# Patient Record
Sex: MALE | Race: WHITE | NOT HISPANIC OR LATINO | Employment: OTHER | URBAN - METROPOLITAN AREA
[De-identification: names, ages, dates, MRNs, and addresses within clinical notes are randomized per-mention and may not be internally consistent; named-entity substitution may affect disease eponyms.]

---

## 2023-02-16 ENCOUNTER — OFFICE VISIT (OUTPATIENT)
Dept: INTERNAL MEDICINE | Facility: CLINIC | Age: 33
End: 2023-02-16
Payer: COMMERCIAL

## 2023-02-16 VITALS
BODY MASS INDEX: 25.58 KG/M2 | TEMPERATURE: 97.8 F | HEART RATE: 70 BPM | WEIGHT: 163 LBS | OXYGEN SATURATION: 98 % | SYSTOLIC BLOOD PRESSURE: 124 MMHG | DIASTOLIC BLOOD PRESSURE: 64 MMHG | RESPIRATION RATE: 18 BRPM | HEIGHT: 67 IN

## 2023-02-16 DIAGNOSIS — D22.9 NEVUS: ICD-10-CM

## 2023-02-16 DIAGNOSIS — Z13.9 ENCOUNTER FOR SCREENING: ICD-10-CM

## 2023-02-16 DIAGNOSIS — Z00.00 HEALTHCARE MAINTENANCE: Primary | ICD-10-CM

## 2023-02-16 DIAGNOSIS — Z30.09 VASECTOMY EVALUATION: ICD-10-CM

## 2023-02-16 PROCEDURE — 2014F MENTAL STATUS ASSESS: CPT | Performed by: STUDENT IN AN ORGANIZED HEALTH CARE EDUCATION/TRAINING PROGRAM

## 2023-02-16 PROCEDURE — 3008F BODY MASS INDEX DOCD: CPT | Performed by: STUDENT IN AN ORGANIZED HEALTH CARE EDUCATION/TRAINING PROGRAM

## 2023-02-16 PROCEDURE — 99385 PREV VISIT NEW AGE 18-39: CPT | Performed by: STUDENT IN AN ORGANIZED HEALTH CARE EDUCATION/TRAINING PROGRAM

## 2023-02-16 NOTE — PATIENT INSTRUCTIONS

## 2023-02-16 NOTE — PROGRESS NOTES
New Patient Office Visit      Date: 2023   Patient Name: David Ross  : 1990   MRN: [id number]     Chief Complaint:    Chief Complaint   Patient presents with   • Annual Exam     Establish care       History of Present Illness: David Ross is a 32 y.o. male who is here today to establish care.    The patient would like a referral for a vasectomy. The patient denies any pain in that region or swelling. He would like to have it done as a form of contraception.     The patient mentions he has had a small spot on his skin for the past 10-15 years. He says it looks blueish in color. He says that he had it looked at several years ago but was told it was fine. He would like to see a dermatologist for an opinion.     The patient states that occasionally he has some neck and hip pain but manages it through a chiropractor and exercise. He states he drives a lot and can be sitting for 4-6 hours at a time which aggravates the hip pain. He states he has never taken regular pain medication for it.     The patient states his father has Parkinsons and high blood pressure. His father has had CABG a few times. He states his grandmother had pancreatic cancer. The patient recently moved and has not yet seen a dentist or ophthalmologist in the area yet. The patient states he does well with diet and exercise but could use more. He says he plans on signing up for a gym. He denies feeling unsafe at home or is a victim of any abuse.     The patient believes that he is up to date on all his vaccines aside from tetanus. He will have his medical records sent from his prior doctor in [place].      Subjective      Review of Systems:   Review of Systems   Constitutional: Negative for activity change, appetite change, fatigue and fever.   Eyes: Negative for blurred vision, photophobia and visual disturbance.   Respiratory: Negative for cough, chest tightness and shortness of breath.    Cardiovascular: Negative for chest  pain and palpitations.   Gastrointestinal: Negative for abdominal distention, abdominal pain, blood in stool, constipation, diarrhea, nausea and vomiting.   Genitourinary: Negative for dysuria and hematuria.   Musculoskeletal: Negative for arthralgias, back pain and joint swelling.   Skin: Negative for rash and wound.   Neurological: Negative for weakness, headache and confusion.       Past Medical History: History reviewed. No pertinent past medical history.    Past Surgical History: History reviewed. No pertinent surgical history.    Family History:   Family History   Problem Relation Age of Onset   • Parkinsonism Father    • Coronary artery disease Father    • Hypertension Father    • Pancreatic cancer Maternal Grandmother        Social History:   Social History     Socioeconomic History   • Marital status:    Tobacco Use   • Smoking status: Never   • Smokeless tobacco: Never   Vaping Use   • Vaping Use: Never used       Medications:   No current outpatient medications on file.    Allergies:   No Known Allergies    Immunizations:    There is no immunization history on file for this patient.     Colorectal Screening: Up-to-date  Last Completed Colonoscopy     This patient has no relevant Health Maintenance data.        CT for Smoker (Age 50-80, 20pk yr within last 15 years): Up-to-date  Bone Density/DEXA (high risk): Not applicable  Hep C (Age 18-79 once): Deferred  HIV (Age 15-65 once) : N/A  PSA (Over age 50, C Level Recommendation): Did not order  US Aorta (For male smokers, age 65): Not applicable  A1c: N/A  Lipid panel: N/A      Dermatology: ordered per request  Ophthalmologist: plans to establish  Dentist: plans to establish    Tobacco Use: Low Risk    • Smoking Tobacco Use: Never   • Smokeless Tobacco Use: Never   • Passive Exposure: Not on file       Social History     Substance and Sexual Activity   Alcohol Use None        Social History     Substance and Sexual Activity   Drug Use Not on file     "    Diet/Physical activity: Counseled on 02/16/23    PHQ-2 Depression Screening  PHQ-9 Total Score: 0       Intimate partner violence: (Screen on initial visit, older adult with injury or evidence of neglect): No concerns  • Violence can be a problem in many people's lives, so I now ask every patient about trauma or abuse they may have experienced in a relationship.  • Stress/Safety - Do you feel safe in your relationship?  • Afraid/Abused - Have you ever been in a relationship where you were threatened, hurt, or afraid?  • Friend/Family - Are your friends aware you have been hurt?  • Emergency Plan - Do you have a safe place to go and the resources you need in an emergency?    Osteoporosis: No concerns  • Men: history of low trauma fracture, androgen deprivation therapy for prostate cancer, hypogonadism, primary hyperparathyroidism, intestinal disorders.     Objective     Physical Exam:  Vital Signs:   Vitals:    02/16/23 1039   BP: 124/64   BP Location: Right arm   Patient Position: Sitting   Cuff Size: Adult   Pulse: 70   Resp: 18   Temp: 97.8 °F (36.6 °C)   TempSrc: Temporal   SpO2: 98%   Weight: 73.9 kg (163 lb)   Height: 171 cm (67.32\")   PainSc: 0-No pain     Body mass index is 25.29 kg/m².    Physical Exam  Vitals and nursing note reviewed.   Constitutional:       General: He is not in acute distress.     Appearance: Normal appearance. He is normal weight. He is not ill-appearing or toxic-appearing.   HENT:      Nose: No congestion or rhinorrhea.   Eyes:      General:         Right eye: No discharge.         Left eye: No discharge.      Conjunctiva/sclera: Conjunctivae normal.   Cardiovascular:      Rate and Rhythm: Normal rate and regular rhythm.      Heart sounds: Normal heart sounds. No murmur heard.    No friction rub.   Pulmonary:      Effort: Pulmonary effort is normal. No respiratory distress.      Breath sounds: Normal breath sounds. No wheezing or rhonchi.   Abdominal:      General: Abdomen is flat. " Bowel sounds are normal. There is no distension.      Palpations: Abdomen is soft. There is no mass.      Tenderness: There is no abdominal tenderness. There is no guarding or rebound.   Musculoskeletal:      Cervical back: Normal range of motion.      Right lower leg: No edema.      Left lower leg: No edema.   Skin:     Findings: No lesion or rash.   Neurological:      General: No focal deficit present.      Mental Status: He is alert. Mental status is at baseline.      Coordination: Coordination normal.      Gait: Gait normal.   Psychiatric:         Mood and Affect: Mood normal.         Behavior: Behavior normal.         Thought Content: Thought content normal.         Judgment: Judgment normal.         Procedures    Results:     Labs:   No results found for: HGBA1C, CMP, CBCDIFFPANEL, CREAT, TSH     Imaging:   No valid procedures specified.     Assessment / Plan      Assessment/Plan:   Problem List Items Addressed This Visit        Genitourinary and Reproductive     Vasectomy evaluation    Current Assessment & Plan     - I will write a referral to urology for a vasectomy         Relevant Orders    Ambulatory Referral to Urology       Skin    Nevus    Current Assessment & Plan     - I will write a referral to dermatology         Relevant Orders    Ambulatory Referral to Dermatology (Completed)   Other Visit Diagnoses     Healthcare maintenance    -  Primary    Encounter for screening        Relevant Orders    Ambulatory Referral to Dermatology (Completed)        Healthcare Maintenance:  Counseling provided based on age appropriate USPSTF guidelines.  BMI is >= 25 and <30. (Overweight) The following options were offered after discussion;: exercise counseling/recommendations and nutrition counseling/recommendations    David Ross voices understanding and acceptance of this advice and will call back with any further questions or concerns. AVS with preventive healthcare tips printed for patient.     “Discussed  risks/benefits to vaccination, reviewed components of the vaccine, discussed VIS, discussed informed consent, informed consent obtained. Patient/Parent was allowed to accept or refuse vaccine. Questions answered to satisfactory state of patient/Parent. We reviewed typical age appropriate and seasonally appropriate vaccinations. Reviewed immunization history and updated state vaccination form as needed. Patient was counseled on COVID-19  Influenza  Tdap      Follow Up:   Return in about 1 year (around 2/16/2024) for Annual.    Transcribed from ambient dictation for Jesse Christian MD by Annika Feliciano.  02/16/23   13:13 EST    Patient or patient representative verbalized consent to the visit recording.  I have personally performed the services described in this document as transcribed by the above individual, and it is both accurate and complete.      Jesse Christian MD  [facility]

## 2023-03-15 ENCOUNTER — OFFICE VISIT (OUTPATIENT)
Dept: UROLOGY | Facility: CLINIC | Age: 33
End: 2023-03-15
Payer: COMMERCIAL

## 2023-03-15 VITALS
DIASTOLIC BLOOD PRESSURE: 78 MMHG | WEIGHT: 163 LBS | BODY MASS INDEX: 25.58 KG/M2 | HEIGHT: 67 IN | SYSTOLIC BLOOD PRESSURE: 122 MMHG | HEART RATE: 78 BPM | OXYGEN SATURATION: 99 %

## 2023-03-15 DIAGNOSIS — Z30.09 VASECTOMY EVALUATION: Primary | ICD-10-CM

## 2023-03-15 PROCEDURE — 1160F RVW MEDS BY RX/DR IN RCRD: CPT | Performed by: STUDENT IN AN ORGANIZED HEALTH CARE EDUCATION/TRAINING PROGRAM

## 2023-03-15 PROCEDURE — 1159F MED LIST DOCD IN RCRD: CPT | Performed by: STUDENT IN AN ORGANIZED HEALTH CARE EDUCATION/TRAINING PROGRAM

## 2023-03-15 PROCEDURE — 99203 OFFICE O/P NEW LOW 30 MIN: CPT | Performed by: STUDENT IN AN ORGANIZED HEALTH CARE EDUCATION/TRAINING PROGRAM

## 2023-03-15 NOTE — PROGRESS NOTES
Office Note Vasectomy Consult     Patient Name: David Ross  : 1990   MRN: [id number]     Chief Complaint:  Elective Sterilization.   Chief Complaint   Patient presents with   • Vasectomy Consult       Referring Provider: Jesse Christian MD    History of Present Illness: David Ross is a 32 y.o. male who presents to Urology today with the desire for irreversible, elective sterilization.     He has 5 children. He states his wife has significant issues with pregnancy complications and would like to avoid future pregnancies.     His and his wife have discussed this decision at length and both would like to proceed with elective sterilization.    He has been considering this decision for 2 years.    Patient denies history of prior scrotal surgery, denies significant history of scrotal injury, denies any baseline chronic testicular pain.    Subjective      Review of Systems: Review of Systems   Genitourinary: Negative for decreased urine volume, difficulty urinating, dysuria, enuresis, flank pain, frequency, hematuria and urgency.      I have reviewed the ROS documented by my clinical staff, I have updated appropriately and I agree. Oli Mayer MD    Past Medical History: History reviewed. No pertinent past medical history.    Past Surgical History: History reviewed. No pertinent surgical history.    Family History:   Family History   Problem Relation Age of Onset   • Parkinsonism Father    • Coronary artery disease Father    • Hypertension Father    • Pancreatic cancer Maternal Grandmother        Social History:   Social History     Socioeconomic History   • Marital status:    Tobacco Use   • Smoking status: Never   • Smokeless tobacco: Never   Vaping Use   • Vaping Use: Never used       Medications:   No current outpatient medications on file.    Allergies:   No Known Allergies      Physical Exam:   Vital Signs:   Vitals:    03/15/23 0832   BP: 122/78   Pulse: 78   SpO2: 99%  "  Weight: 73.9 kg (163 lb)   Height: 171 cm (67.32\")     Body mass index is 25.29 kg/m².     Physical Exam  Vitals and nursing note reviewed.   Constitutional:       Appearance: Normal appearance.   HENT:      Head: Normocephalic and atraumatic.      Nose: Nose normal.      Mouth/Throat:      Mouth: Mucous membranes are moist.      Pharynx: Oropharynx is clear.   Eyes:      Extraocular Movements: Extraocular movements intact.      Conjunctiva/sclera: Conjunctivae normal.      Pupils: Pupils are equal, round, and reactive to light.   Cardiovascular:      Rate and Rhythm: Normal rate and regular rhythm.   Pulmonary:      Effort: Pulmonary effort is normal. No respiratory distress.   Abdominal:      Palpations: Abdomen is soft.   Genitourinary:     Comments:  Circumcised phallus, orthotopic meatus, bilaterally descended testicles without masses, or lesions. Vas deferens palpable bilaterally.        Musculoskeletal:         General: Normal range of motion.      Cervical back: Normal range of motion and neck supple.   Skin:     General: Skin is warm and dry.      Findings: No lesion or rash.   Neurological:      General: No focal deficit present.      Mental Status: He is alert and oriented to person, place, and time. Mental status is at baseline.   Psychiatric:         Mood and Affect: Mood normal.         Behavior: Behavior normal.         Labs:   Brief Urine Lab Results     None               No results found for: GLUCOSE, CALCIUM, NA, K, CO2, CL, BUN, CREATININE, EGFRIFAFRI, EGFRIFNONA, BCR, ANIONGAP    No results found for: WBC, HGB, HCT, MCV, PLT    Images:   No Images in the past 120 days found..    Measures:   Tobacco:   David Ross  reports that he has never smoked. He has never used smokeless tobacco.    Assessment / Plan      Assessment/Plan:   Mr. Ross is a 32 y.o. male who presented to clinic today for irreversible elective sterilization.      He would like to take some time to consider when he would " like to schedule. He was given cost estimate sheet to consider differences between surgery center and clinic vasectomy.     He was offered valium prior but he would like to defer.     Diagnoses and all orders for this visit:    1. Vasectomy evaluation (Primary)         Patient Education:   The patient has requested a vasectomy for elective sterilization and the risks and benefits of the procedure were explained at length. The procedure itself was explained and postop followup explained at this point. The patient was given a prescription for Valium 10 mg to be taken 30 minutes prior to the procedure.  We discussed he will need a  to take him home. I extensively reviewed with him the likely postoperative recuperative period as well as the need to continue to use contraception until he is notified by me of his sterility.     I discussed with the patient that I am able to perform this procedure in the urology clinic under a local anesthetic, and also offer the procedure to be performed at the outpatient surgery center under light anesthetic if that would be the desire of the patient.  I discussed that in clinic procedure is likely significantly cheaper than performing this at an outpatient surgery center.  I counseled the patient to speak to the outpatient surgery center billing department and with his insurance company prior to determine out-of-pocket costs if this is something he would like to consider.  When I perform this procedure in the clinic, I typically offer a 10 mg tablet of Valium 45 to 60 minutes prior to the vasectomy for anxiolysis.    He will undergo a semen analysis 3 months post-procedure AND 20 ejaculations before his first semen analysis check. He understands the potential side effects of anesthesia, bleeding, scrotal hematoma, wound infection, epididymo-orchitis, epididymal congestion, chronic testicular pain requiring further intervention/surgery, sperm granuloma, recanalization and risk of  sperm return and/or pregnancy  (1 in 2000 as per the AUA guidelines).     Patient understands and would like to proceed with vasectomy, he is unsure whether clinic vs surgery center at this time, he will reach out to me.     Follow Up:   No follow-ups on file.    I spent approximately 30 minutes providing clinical care for this patient; including review of patient's chart and provider documentation, face to face time spent with patient in examination room (obtaining history, performing physical exam, discussing diagnosis and management options), placing orders, and completing patient documentation.     Oli Mayer MD  [facility] [place]

## 2023-09-19 ENCOUNTER — TELEPHONE (OUTPATIENT)
Dept: INTERNAL MEDICINE | Facility: CLINIC | Age: 33
End: 2023-09-19

## 2023-09-19 ENCOUNTER — HOSPITAL ENCOUNTER (EMERGENCY)
Facility: HOSPITAL | Age: 33
Discharge: HOME | End: 2023-09-19
Attending: EMERGENCY MEDICINE | Admitting: EMERGENCY MEDICINE

## 2023-09-19 VITALS
DIASTOLIC BLOOD PRESSURE: 89 MMHG | TEMPERATURE: 99.5 F | SYSTOLIC BLOOD PRESSURE: 132 MMHG | OXYGEN SATURATION: 98 % | HEART RATE: 96 BPM | HEIGHT: 67 IN | RESPIRATION RATE: 18 BRPM | WEIGHT: 160 LBS | BODY MASS INDEX: 25.11 KG/M2

## 2023-09-19 DIAGNOSIS — M70.41 PREPATELLAR BURSITIS, RIGHT KNEE: Primary | ICD-10-CM

## 2023-09-19 PROCEDURE — 99283 EMERGENCY DEPT VISIT LOW MDM: CPT

## 2023-09-19 RX ORDER — SULFAMETHOXAZOLE AND TRIMETHOPRIM 800; 160 MG/1; MG/1
1 TABLET ORAL 2 TIMES DAILY
Qty: 14 TABLET | Refills: 0 | Status: SHIPPED | OUTPATIENT
Start: 2023-09-19 | End: 2023-09-26

## 2023-09-19 RX ORDER — SULFAMETHOXAZOLE AND TRIMETHOPRIM 800; 160 MG/1; MG/1
1 TABLET ORAL ONCE
Status: COMPLETED | OUTPATIENT
Start: 2023-09-19 | End: 2023-09-19

## 2023-09-19 RX ADMIN — SULFAMETHOXAZOLE AND TRIMETHOPRIM 1 TABLET: 800; 160 TABLET ORAL at 22:43

## 2023-09-20 NOTE — DISCHARGE INSTRUCTIONS
I recommend you take Tylenol 650 mg every 6 hours and ibuprofen 400 mg every 6 hours as needed for pain unless you have a contraindication to these medications  Use ice to help with pain and swelling.  Take prescribed bactrim.  Call to schedule a close followup appointment with an orthopedic physician.  Return to the ER as needed for new or worsening symptoms.

## 2023-09-20 NOTE — ED PROVIDER NOTES
Subjective   History of Present Illness  Patient presents for evaluation of pain, swelling, erythema of the right anterior knee that started yesterday evening.  It has limited his ability to walk because of the pain.  The pain is mostly when patient is in a completely flexed position or when he engages his extensor musculature.  No history of trauma to the knee, no prior procedural interventions on the knee or right lower extremity.  No numbness, weakness.  Patient has had chills and had a temperature of 38 °C yesterday.    History provided by:  Patient    Review of Systems    No past medical history on file.    No Known Allergies    No past surgical history on file.    Family History   Problem Relation Age of Onset    Parkinsonism Father     Coronary artery disease Father     Hypertension Father     Pancreatic cancer Maternal Grandmother        Social History     Socioeconomic History    Marital status:    Tobacco Use    Smoking status: Never    Smokeless tobacco: Never   Vaping Use    Vaping Use: Never used   Substance and Sexual Activity    Alcohol use: Not Currently           Objective   Physical Exam  Constitutional:       General: He is not in acute distress.  HENT:      Head: Normocephalic and atraumatic.   Eyes:      Conjunctiva/sclera: Conjunctivae normal.      Pupils: Pupils are equal, round, and reactive to light.   Cardiovascular:      Rate and Rhythm: Normal rate and regular rhythm.      Pulses: Normal pulses.      Heart sounds: No murmur heard.    No gallop.   Pulmonary:      Effort: Pulmonary effort is normal. No respiratory distress.   Abdominal:      General: Abdomen is flat. There is no distension.      Tenderness: There is no abdominal tenderness.   Musculoskeletal:      Comments: There is soft tissue swelling and erythema of the right knee that is isolated to the anterior superior aspect of the knee at the prepatellar bursa.  There is no erythema, swelling, tenderness along the lateral  aspects of the joint, or posterior aspect of the joint.  There is full range of motion.  With complete flexion of the knee the patient has significant tenderness but when held it in mid flexed or extended position patient does not have significant discomfort.  There is normal capillary refill distal.  Normal motor and sensation.   Skin:     General: Skin is warm and dry.      Capillary Refill: Capillary refill takes less than 2 seconds.   Neurological:      General: No focal deficit present.      Mental Status: He is alert and oriented to person, place, and time.   Psychiatric:         Mood and Affect: Mood normal.         Behavior: Behavior normal.       Procedures           ED Course                                           Medical Decision Making  Differential items considered includes prepatellar bursitis, septic arthritis, septic bursitis, skin soft tissue infection/cellulitis.  At this time patient's physical exam findings are very characteristic of a prepatellar bursitis and I have a very low suspicion for a septic arthritis at this time.  Patient feels comfortable with a course of conservative management with anti-inflammatories, rest, ice.  Given that he spiked a fever there may be bacterial infection of the bursa and so he was placed on a course of Bactrim.  He was given outpatient orthopedic follow-up.  He is able to achieve close follow-up with his primary care doctor as well.  He was counseled very strictly on signs of a septic arthritis or worsening infection and return precautions to the ER.      Problems Addressed:  Prepatellar bursitis, right knee: complicated acute illness or injury    Risk  OTC drugs.  Prescription drug management.        Final diagnoses:   Prepatellar bursitis, right knee       ED Disposition  ED Disposition       ED Disposition   Discharge    Condition   Stable    Comment   --           No results found for this or any previous visit (from the past 24 hour(s)).  Note: In  "addition to lab results from this visit, the labs listed above may include labs taken at another facility or during a different encounter within the last 24 hours. Please correlate lab times with ED admission and discharge times for further clarification of the services performed during this visit.    No orders to display     Vitals:    09/19/23 2030   BP: 132/89   BP Location: Right arm   Patient Position: Sitting   Pulse: 96   Resp: 18   Temp: 99.5 °F (37.5 °C)   TempSrc: Oral   SpO2: 98%   Weight: 72.6 kg (160 lb)   Height: 170.2 cm (67\")     Medications   sulfamethoxazole-trimethoprim (BACTRIM DS,SEPTRA DS) 800-160 MG per tablet 1 tablet (has no administration in time range)     ECG/EMG Results (last 24 hours)       ** No results found for the last 24 hours. **          No orders to display           Ubaldo Bowden MD  [address] [address]  [phone]    Call in 1 day           Medication List      No changes were made to your prescriptions during this visit.            Nicanor Hart MD  09/19/23 2211    "

## 2024-02-16 ENCOUNTER — OFFICE VISIT (OUTPATIENT)
Dept: INTERNAL MEDICINE | Facility: CLINIC | Age: 34
End: 2024-02-16
Payer: COMMERCIAL

## 2024-02-16 VITALS
DIASTOLIC BLOOD PRESSURE: 74 MMHG | WEIGHT: 158.8 LBS | BODY MASS INDEX: 24.92 KG/M2 | HEART RATE: 74 BPM | RESPIRATION RATE: 16 BRPM | SYSTOLIC BLOOD PRESSURE: 116 MMHG | TEMPERATURE: 97.3 F | HEIGHT: 67 IN

## 2024-02-16 DIAGNOSIS — E16.2 HYPOGLYCEMIA: ICD-10-CM

## 2024-02-16 DIAGNOSIS — Z00.00 HEALTHCARE MAINTENANCE: Primary | ICD-10-CM

## 2024-02-16 DIAGNOSIS — Z13.9 ENCOUNTER FOR SCREENING: ICD-10-CM

## 2024-02-16 PROBLEM — Z30.09 VASECTOMY EVALUATION: Status: RESOLVED | Noted: 2023-02-16 | Resolved: 2024-02-16

## 2024-02-16 LAB
ALBUMIN SERPL-MCNC: 4.7 G/DL (ref 3.5–5.2)
ALBUMIN/GLOB SERPL: 2 G/DL
ALP SERPL-CCNC: 58 U/L (ref 39–117)
ALT SERPL W P-5'-P-CCNC: 17 U/L (ref 1–41)
ANION GAP SERPL CALCULATED.3IONS-SCNC: 9 MMOL/L (ref 5–15)
AST SERPL-CCNC: 22 U/L (ref 1–40)
BASOPHILS # BLD AUTO: 0.09 10*3/MM3 (ref 0–0.2)
BASOPHILS NFR BLD AUTO: 1.5 % (ref 0–1.5)
BILIRUB SERPL-MCNC: 0.7 MG/DL (ref 0–1.2)
BUN SERPL-MCNC: 19 MG/DL (ref 6–20)
BUN/CREAT SERPL: 20.7 (ref 7–25)
CALCIUM SPEC-SCNC: 9.7 MG/DL (ref 8.6–10.5)
CHLORIDE SERPL-SCNC: 104 MMOL/L (ref 98–107)
CHOLEST SERPL-MCNC: 227 MG/DL (ref 0–200)
CO2 SERPL-SCNC: 28 MMOL/L (ref 22–29)
CREAT SERPL-MCNC: 0.92 MG/DL (ref 0.76–1.27)
DEPRECATED RDW RBC AUTO: 39.9 FL (ref 37–54)
EGFRCR SERPLBLD CKD-EPI 2021: 111.2 ML/MIN/1.73
EOSINOPHIL # BLD AUTO: 0.3 10*3/MM3 (ref 0–0.4)
EOSINOPHIL NFR BLD AUTO: 4.9 % (ref 0.3–6.2)
ERYTHROCYTE [DISTWIDTH] IN BLOOD BY AUTOMATED COUNT: 12.9 % (ref 12.3–15.4)
EXPIRATION DATE: NORMAL
GLOBULIN UR ELPH-MCNC: 2.3 GM/DL
GLUCOSE SERPL-MCNC: 86 MG/DL (ref 65–99)
HBA1C MFR BLD: 5 % (ref 4.5–5.7)
HCT VFR BLD AUTO: 44.3 % (ref 37.5–51)
HDLC SERPL-MCNC: 53 MG/DL (ref 40–60)
HGB BLD-MCNC: 15.1 G/DL (ref 13–17.7)
IMM GRANULOCYTES # BLD AUTO: 0.02 10*3/MM3 (ref 0–0.05)
IMM GRANULOCYTES NFR BLD AUTO: 0.3 % (ref 0–0.5)
LDLC SERPL CALC-MCNC: 149 MG/DL (ref 0–100)
LDLC/HDLC SERPL: 2.75 {RATIO}
LYMPHOCYTES # BLD AUTO: 3.18 10*3/MM3 (ref 0.7–3.1)
LYMPHOCYTES NFR BLD AUTO: 52.4 % (ref 19.6–45.3)
Lab: NORMAL
MCH RBC QN AUTO: 29.5 PG (ref 26.6–33)
MCHC RBC AUTO-ENTMCNC: 34.1 G/DL (ref 31.5–35.7)
MCV RBC AUTO: 86.7 FL (ref 79–97)
MONOCYTES # BLD AUTO: 0.55 10*3/MM3 (ref 0.1–0.9)
MONOCYTES NFR BLD AUTO: 9.1 % (ref 5–12)
NEUTROPHILS NFR BLD AUTO: 1.93 10*3/MM3 (ref 1.7–7)
NEUTROPHILS NFR BLD AUTO: 31.8 % (ref 42.7–76)
NRBC BLD AUTO-RTO: 0 /100 WBC (ref 0–0.2)
PLATELET # BLD AUTO: 198 10*3/MM3 (ref 140–450)
PMV BLD AUTO: 10.9 FL (ref 6–12)
POTASSIUM SERPL-SCNC: 4.2 MMOL/L (ref 3.5–5.2)
PROT SERPL-MCNC: 7 G/DL (ref 6–8.5)
RBC # BLD AUTO: 5.11 10*6/MM3 (ref 4.14–5.8)
SODIUM SERPL-SCNC: 141 MMOL/L (ref 136–145)
TRIGL SERPL-MCNC: 141 MG/DL (ref 0–150)
VLDLC SERPL-MCNC: 25 MG/DL (ref 5–40)
WBC NRBC COR # BLD AUTO: 6.07 10*3/MM3 (ref 3.4–10.8)

## 2024-02-16 PROCEDURE — 85025 COMPLETE CBC W/AUTO DIFF WBC: CPT | Performed by: STUDENT IN AN ORGANIZED HEALTH CARE EDUCATION/TRAINING PROGRAM

## 2024-02-16 PROCEDURE — 80061 LIPID PANEL: CPT | Performed by: STUDENT IN AN ORGANIZED HEALTH CARE EDUCATION/TRAINING PROGRAM

## 2024-02-16 PROCEDURE — 80053 COMPREHEN METABOLIC PANEL: CPT | Performed by: STUDENT IN AN ORGANIZED HEALTH CARE EDUCATION/TRAINING PROGRAM

## 2024-02-20 PROBLEM — E78.2 MIXED HYPERLIPIDEMIA: Status: ACTIVE | Noted: 2024-02-20

## 2024-11-11 ENCOUNTER — E-VISIT (OUTPATIENT)
Dept: FAMILY MEDICINE CLINIC | Facility: TELEHEALTH | Age: 34
End: 2024-11-11

## 2024-11-11 PROCEDURE — FABRICHEALTHVISIT: Performed by: NURSE PRACTITIONER

## 2024-11-11 NOTE — E-VISIT TREATED
Date: 2024 12:32:20  Clinician: Catrachita Juarez  Clinician NPI: [id number]  Patient: David Ross  Patient : 1990  Patient Address: [address] [place], [place] [address]  Patient Phone: [phone]  Visit Protocol: URI  Patient Summary:  David is a 34 year old ( : 1990 ) male who initiated a visit for cold, sinus infection, or influenza.     David states the symptoms started gradually 2-3 weeks ago. After the symptoms started, they improved and then got worse   again.   Symptom start date: unknown   The symptoms consist of a headache, nasal congestion, a cough, facial pain or pressure, a sore throat, and enlarged lymph nodes.   Symptom details     Nasal secretions: The color of the mucus is yellow and green.    Cough: David coughs a few times an hour and the cough is more bothersome at night. Phlegm comes into the throat when coughing. David believes the cough is caused by post-nasal drip. The color of the phlegm is yellow and green.     Sore throat: David reports having mild throat pain (1-3 on a 10 point pain scale), does not have exudate on the tonsils, and can swallow liquids without any difficulty. The lymph nodes in the neck are enlarged. The lymph node swelling is not worse on   one side and the swelling has not caused changes in speech or hoarseness in the voice. A rash has not appeared on the skin since the sore throat started.     Facial pain or pressure: The facial pain or pressure feels worse when bending over or leaning forward.     Headache: The headache is mild (1-3 on a 10 point pain scale).      David denies having myalgias, nausea, teeth pain, fever, ageusia, wheezing, malaise, anosmia, chills, ear pain, rhinitis, vomiting, and diarrhea. David also denies having recent facial or sinus surgery in the past 60 days, having a sinus infection   within the past year, and taking antibiotic medication in the past month. David is not experiencing dyspnea.   Precipitating events  Within  the past week, David has not been exposed to someone with strep throat. David has not recently been exposed to   someone with influenza. David has not been in close contact with any high risk individuals.    David has not received the influenza vaccination.   Pertinent COVID-19 (Coronavirus) information  Since the symptoms started, David has not tested for   COVID-19.   David has not had COVID-19 in the last 3 months.   David has not received a COVID-19 vaccine in the past year.   Pertinent medical history     A provider has not told David to avoid NSAIDs.   David does not have diabetes. David denies having   immunosuppressive conditions (e.g., chemotherapy, HIV, organ transplant, long-term use of steroids or other immunosuppressive medications, splenectomy). David does not have asthma.   David denies having chronic lung disease, cystic fibrosis,   hypertension, long-term disabilities, mental health conditions, sickle cell disease or thalassemia, stroke or other cardiovascular disease, substance use disorders, or tuberculosis (TB).  David does not smoke or use smokeless tobacco. David does not vape   or use other e-cigarette products.   Weight: 160 lbs (72.57 kg)    MEDICATIONS: No current medications, ALLERGIES: NKDA  Clinician Response:  Dear David,  Based on the information provided, you have acute bacterial sinusitis, also known as a sinus infection. Sinus infections are caused by bacteria or a virus and symptoms are almost always identical. The difference between   the 2 types of infections is timing.  Sinus infections start as viral infections and symptoms improve on their own in about 7 days. A bacterial infection may have developed if any of the following apply to you:     You have had 7 days of symptoms and are experiencing at least 2 of the following:       Fever    Facial pressure or headache    Green or yellow nasal mucus    Symptoms that improved and then got worse again       You have had symptoms for  10 or more days     Medication information  I am prescribing:       Azelastine 137 mcg (0.1 %) nasal aerosol, spray. Inhale 1-2 sprays in each nostril 2 times per day. There are no refills with this prescription.      Amoxicillin 875 mg oral tablet. Take 1 tablet by mouth every 12 hours for 7 days. There are no refills with this prescription.      Brompheniramine-pseudoeph-DM (Bromfed DM) 2-30-10 mg/5mL oral syrup. Take 10 milliliters by mouth every 4 hours as needed for cough. Do not take more than 60mL in 24 hours. There are no refills with this prescription.     Unless you are allergic to the over-the-counter medication(s) below, I recommend using:     Acetaminophen (Tylenol or store brand) oral tablet. Take 1-2 tablets by mouth every 4-6 hours to help with the discomfort.   Over-the-counter medications do not require a prescription. Ask the pharmacist if you have any questions.  Self care  Steps you   can take to be as comfortable as possible:     Rest.    Drink plenty of fluids.    Take a warm shower to loosen congestion.    Use a cool-mist humidifier.    Use throat lozenges.    Suck on frozen items such as popsicles.    Drink hot tea with lemon and honey.    Gargle with warm salt water (1/4 teaspoon of salt per 8 ounce glass of water).    Take a spoonful of honey to reduce your cough.     When to seek care  Please be seen in a clinic or urgent care if any of the following occur:     New symptoms develop, or symptoms become worse    Symptoms do not start to improve after 3 days of treatment     Call 911 or go to the emergency room if any of the following occur:     Difficulty breathing    If you feel that your throat is closing off    Suddenly develop a rash    Unable to swallow fluids or are drooling     It is possible to have an allergic reaction to an antibiotic even if you have not had one in the past. If you notice a new rash, significant swelling, or difficulty breathing, stop taking this medication  immediately and go to a clinic or urgent care.    For the latest updates on COVID-19 (Coronavirus), please visit the Centers for Disease Control and Prevention (CDC). Also, your state and local health department websites may provide additional guidance regarding testing and isolation recommendations for   your location.   Diagnosis: Acute bacterial sinusitis  Diagnosis ICD: J01.90    Follow up instructions: ATTENTION: If you have been prescribed medications, your prescriptions will not be sent until you choose your pharmacy.  To do so open the link within your notification, or go to [facility] and click eVisit in the menu to open your   treatment plan. From there, you can select your pharmacy at the bottom of your after visit summary. You can also go to [email or web address]  Prescriptions  Prescription: brompheniramine-pseudoeph-DM (Bromfed DM) 2-30-10 mg/5 mL oral syrup, take 10 milliliters by mouth every 4 hours as needed for cough  Sent To: [facility] [id number] - [id number] - [address] [place] [address]  Prescription: azelastine 137 mcg (0.1 %) nasal aerosol,spray, inhale 2 sprays in each nostril 2 times per day  Sent To: [facility] [id number] - [id number] [place] [address]  Prescription: amoxicillin 875 mg oral tablet, take 1 tablet by mouth every 12 hours for 7 days  Sent To: [facility] [id number] - [id number] [place] [address]

## 2025-01-09 ENCOUNTER — PATIENT MESSAGE (OUTPATIENT)
Dept: INTERNAL MEDICINE | Facility: CLINIC | Age: 35
End: 2025-01-09
Payer: COMMERCIAL

## 2025-01-09 DIAGNOSIS — D22.9 NEVUS: Primary | ICD-10-CM

## 2025-01-14 ENCOUNTER — TELEPHONE (OUTPATIENT)
Age: 35
End: 2025-01-14
Payer: COMMERCIAL

## 2025-01-14 NOTE — TELEPHONE ENCOUNTER
"Relay     \"M for PT that his consult visit had been over six months (actually 03/15/23) and he would need to schedule a follow up with Dr. Mayer before he could schedule his requested vasectomy.  HUB - ok to schedule a follow up with Dr. Mayer.\"                 "

## 2025-02-05 ENCOUNTER — OFFICE VISIT (OUTPATIENT)
Dept: UROLOGY | Facility: CLINIC | Age: 35
End: 2025-02-05
Payer: COMMERCIAL

## 2025-02-05 VITALS
WEIGHT: 165 LBS | DIASTOLIC BLOOD PRESSURE: 76 MMHG | OXYGEN SATURATION: 97 % | HEART RATE: 85 BPM | HEIGHT: 67 IN | SYSTOLIC BLOOD PRESSURE: 124 MMHG | BODY MASS INDEX: 25.9 KG/M2

## 2025-02-05 DIAGNOSIS — Z30.09 VASECTOMY EVALUATION: Primary | ICD-10-CM

## 2025-02-05 NOTE — PROGRESS NOTES
Office Note Vasectomy Consult     Patient Name: David Ross  : 1990   MRN: [id number]     Chief Complaint:  Elective Sterilization.   Chief Complaint   Patient presents with    Vasectomy Evaluation       Referring Provider: No ref. provider found    History of Present Illness: David Ross is a 34 y.o. male who presents to Urology today with the desire for irreversible, elective sterilization.     He has 5 children.    He is . His female partner is 29 yo.      His and his partner have discussed this decision at length and both would like to proceed with elective sterilization.    He has been considering this decision for 2 years.    Patient denies history of prior scrotal surgery, denies significant history of scrotal injury, denies any baseline chronic testicular pain.    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       Subjective      Review of Systems: Review of Systems   Genitourinary:  Negative for decreased urine volume, difficulty urinating, dysuria, enuresis, flank pain, frequency, hematuria and urgency.      I have reviewed the ROS documented by my clinical staff, I have updated appropriately and I agree. Oli Mayer MD    Past Medical History: History reviewed. No pertinent past medical history.    Past Surgical History: History reviewed. No pertinent surgical history.    Family History:   Family History   Problem Relation Age of Onset    Parkinsonism Father     Coronary artery disease Father     Hypertension Father     Pancreatic cancer Maternal Grandmother        Social History:   Social History     Socioeconomic History    Marital status:    Tobacco Use    Smoking status: Never    Smokeless tobacco: Never   Vaping Use    Vaping status: Never Used   Substance and Sexual Activity    Alcohol use: Never    Drug use: Never    Sexual activity: Yes     Partners: Female     Birth control/protection: Condom       Medications:   No current outpatient medications on  "file.    Allergies:   No Known Allergies          Objective     Physical Exam:   Vital Signs:   Vitals:    02/05/25 1556   BP: 124/76   Pulse: 85   SpO2: 97%   Weight: 74.8 kg (165 lb)   Height: 170.2 cm (67\")     Body mass index is 25.84 kg/m².     Physical Exam  Vitals and nursing note reviewed.   Constitutional:       Appearance: Normal appearance.   HENT:      Head: Normocephalic and atraumatic.      Nose: Nose normal.      Mouth/Throat:      Mouth: Mucous membranes are moist.      Pharynx: Oropharynx is clear.   Eyes:      Extraocular Movements: Extraocular movements intact.      Conjunctiva/sclera: Conjunctivae normal.      Pupils: Pupils are equal, round, and reactive to light.   Cardiovascular:      Rate and Rhythm: Normal rate and regular rhythm.   Pulmonary:      Effort: Pulmonary effort is normal. No respiratory distress.   Abdominal:      Palpations: Abdomen is soft.      Tenderness: There is no abdominal tenderness. There is no right CVA tenderness or left CVA tenderness.   Genitourinary:     Comments:  Uncircumcised phallus, orthotopic meatus, bilaterally descended testicles without masses, or lesions. Vas deferens palpable bilaterally.      Musculoskeletal:         General: Normal range of motion.      Cervical back: Normal range of motion and neck supple.   Skin:     General: Skin is warm and dry.      Findings: No lesion or rash.   Neurological:      General: No focal deficit present.      Mental Status: He is alert and oriented to person, place, and time. Mental status is at baseline.   Psychiatric:         Mood and Affect: Mood normal.         Behavior: Behavior normal.         Labs:   Brief Urine Lab Results       None                 Lab Results   Component Value Date    GLUCOSE 86 02/16/2024    CALCIUM 9.7 02/16/2024     02/16/2024    K 4.2 02/16/2024    CO2 28.0 02/16/2024     02/16/2024    BUN 19 02/16/2024    CREATININE 0.92 02/16/2024    BCR 20.7 02/16/2024    ANIONGAP 9.0 " 02/16/2024       Lab Results   Component Value Date    WBC 6.07 02/16/2024    HGB 15.1 02/16/2024    HCT 44.3 02/16/2024    MCV 86.7 02/16/2024     02/16/2024       Images:   No Images in the past 120 days found..    Measures:   Tobacco:   David Ross  reports that he has never smoked. He has never used smokeless tobacco.      Assessment / Plan      Assessment/Plan:   Mr. Ross is a 34 y.o. male who presented to clinic today for irreversible elective sterilization.     The patient has requested a vasectomy for elective sterilization and the risks and benefits of the procedure were explained at length. The procedure itself was explained and postop followup explained. Vasectomy can be performed in clinic under local anesthesia or under sedation at surgery center. Valium 10 mg is offered 1 hour prior to vasectomy if performed in clinic for anxiolysis.  We discussed he will need a  to take him home if he elects for valium pre-procedure. I extensively reviewed with him the likely postoperative recuperative period as well as the need to continue to use contraception until he is notified by me of his sterility.     If proceeding at surgery center, I counseled the patient to speak to the outpatient surgery center billing department and with his insurance company prior to determine out-of-pocket costs if this is something he would like to consider.       He will undergo a semen analysis 3 months post-procedure AND perform 20 ejaculations before his first semen analysis check to clear remaining sperm. He understands the potential side effects of anesthesia, bleeding, scrotal hematoma, wound infection, epididymo-orchitis, epididymal congestion, chronic testicular pain requiring further intervention/surgery, sperm granuloma, recanalization and risk of sperm return and/or pregnancy (1/2000 risk of failure as per the AUA guidelines).     Patient understands risks and is willing to proceed.     Based on normal  anatomy, and patient preference, we will proceed with vasectomy in clinic.     Diagnoses and all orders for this visit:    1. Vasectomy evaluation (Primary)            Follow Up:   Return in about 20 days (around 2/25/2025) for Vasectomy so[address] rd .    I spent approximately 30 minutes providing clinical care for this patient; including review of patient's chart and provider documentation, face to face time spent with patient in examination room (obtaining history, performing physical exam, discussing diagnosis and management options), placing orders, and completing patient documentation.     Oli Mayer MD  [facility] [place]

## 2025-02-25 ENCOUNTER — PROCEDURE VISIT (OUTPATIENT)
Dept: UROLOGY | Facility: CLINIC | Age: 35
End: 2025-02-25
Payer: COMMERCIAL

## 2025-02-25 DIAGNOSIS — Z30.2 ENCOUNTER FOR VASECTOMY: Primary | ICD-10-CM

## 2025-02-25 RX ORDER — HYDROCODONE BITARTRATE AND ACETAMINOPHEN 5; 325 MG/1; MG/1
1 TABLET ORAL EVERY 6 HOURS PRN
Qty: 6 TABLET | Refills: 0 | Status: SHIPPED | OUTPATIENT
Start: 2025-02-25

## 2025-02-25 NOTE — PROGRESS NOTES
Preprocedure diagnosis  Encounter for sterilization (Z30.2)     Postprocedure diagnosis  Encounter for sterilization (Z30.2)     Procedure  Bilateral Vasectomy    Attending surgeon  Oli Mayer MD    Anesthesia  1% Lidocaine     Complications  None    Indications  34 y.o. male who desires desires elective sterility presents for vasectomy.  Informed consent was reviewed and signed.  Risks, benefits, alternatives to the procedure were discussed.       Findings  - Uncomplicated bilateral vasectomy  - 1 cm segments of the bilateral vas deferens sent to pathology    Procedure  The patient was identified and informed consent was reviewed and signed.  He was placed in the supine position.  His genitals were prepped and draped in sterile fashion.  I isolated the right vas deferens between my fingers.  I injected local anesthetic at the skin and deep to the dartos tissue.  Once the patient was numb, I used the sharp vasa dissector to separate a 1 cm incision in the skin.  I used the ring clamp to isolate the vas deferens and pull this out through the skin.  I used Bovie cautery to cauterize some of the perivasal vessels and then I skeletonized the vas deferens for 1-1/2 cm.  I used 2 Mavis clamps to clamp the testicular and abdominal ends of the vas deferens.  Between the Mavis I took a 1 cm segment of the vas deferens by excising with the Bovie cautery.  I then cauterized the remaining ends of the vas deferens with the Bovie cautery.        Next I cleared off some of the perivasal vasculature below the Mavis clamp and used the Bovie and Adson clamps for cauterizing these vessels, I then suture tied the ends of the vas deferens back onto themselves to prevent recanalization with a 4-0 Chromic suture.  Once hemostasis was confirmed, I returned both ends of the vas deferens into the right-sided hemiscrotal space.  I used the remaining 4-0 chromic suture to sew a horizontal mattress stitch at the skin incision.   Hemostasis was confirmed.      I performed the identical procedure on the left side.   The skin was closed with a horizontal mattress 4-0 chromic suture again.  Neosporin was applied over the incisions and scrotal fluff gauze were placed.  The patient tolerated the procedure well.    Follow Up: Patient will complete 20 ejaculations and wait 3 months before he provides us a semen analysis.  He was sent home with  Neosporin to apply over his incision for 7 days.  Return precautions discussed at length. Discussed the importance of continuing some form of contraception to prevent pregnancy until he receives a phone call from my office notifying him of his negative post-vasectomy semen analysis results.     Oli Mayer MD

## 2025-03-07 ENCOUNTER — TELEPHONE (OUTPATIENT)
Dept: UROLOGY | Facility: CLINIC | Age: 35
End: 2025-03-07
Payer: COMMERCIAL

## 2025-03-07 NOTE — TELEPHONE ENCOUNTER
Called and let the pt know that there was one more paper that his insurance requires to cover the vasectomy. [facility] this [facility] to sign the form.    Will be at the  for him to sign.

## 2025-03-07 NOTE — TELEPHONE ENCOUNTER
----- Message from Nakia BLACKMAN sent at 3/6/2025  3:24 PM EST -----    ----- Message -----  From: Breanna Junior RegSched Rep  Sent: 3/6/2025   3:23 PM EST  To: [facility] [employer] Clinical Pool      ----- Message -----  From: Maxine Mock  Sent: 3/6/2025   2:24 PM EST  To: [facility]; #    Hi,     Need consent form  Sterilization for the patient LACEY MAURER  ( :1990)   DOS :2025.      Thanks,   Fady

## 2025-05-30 ENCOUNTER — LAB (OUTPATIENT)
Dept: LAB | Facility: HOSPITAL | Age: 35
End: 2025-05-30
Payer: COMMERCIAL

## 2025-05-30 DIAGNOSIS — Z30.2 ENCOUNTER FOR VASECTOMY: ICD-10-CM

## 2025-05-30 LAB — SPERM - POST VASECTOMY: NORMAL

## 2025-05-30 PROCEDURE — 89321 SEMEN ANAL SPERM DETECTION: CPT
